# Patient Record
Sex: FEMALE | Race: OTHER | Employment: UNEMPLOYED | URBAN - METROPOLITAN AREA
[De-identification: names, ages, dates, MRNs, and addresses within clinical notes are randomized per-mention and may not be internally consistent; named-entity substitution may affect disease eponyms.]

---

## 2024-01-10 ENCOUNTER — PROCEDURE VISIT (OUTPATIENT)
Dept: OBGYN CLINIC | Age: 29
End: 2024-01-10

## 2024-01-10 VITALS
SYSTOLIC BLOOD PRESSURE: 122 MMHG | HEIGHT: 67 IN | WEIGHT: 175 LBS | DIASTOLIC BLOOD PRESSURE: 82 MMHG | BODY MASS INDEX: 27.47 KG/M2

## 2024-01-10 DIAGNOSIS — Z97.5 IUD (INTRAUTERINE DEVICE) IN PLACE: ICD-10-CM

## 2024-01-10 DIAGNOSIS — Z30.433 ENCOUNTER FOR REMOVAL AND REINSERTION OF INTRAUTERINE CONTRACEPTIVE DEVICE (IUD): Primary | ICD-10-CM

## 2024-01-10 NOTE — PROGRESS NOTES
[facility] PHYSICIANS [facility] OBSTETRICS AND GYNECOLOGY  [id number]   [id number]  [facility] [address]  Dept: [phone]    Patient Name: Glenny Moreno  Patient Age: 28 y.o.  Date of Visit: 1/10/2024    SUBJECTIVE:    CC:    Chief Complaint   Patient presents with    Procedure         Glenny presents today for removal and re-insertion of Mirena IUD for her complaint of  menorrhagia.  Glenny understands the risks and benefits of the intrauterine device insertion and desires to proceed with its insertion.    OBJECTIVE:  No LMP recorded. (Menstrual status: IUD).    /82   Ht 1.702 m (5' 7\")   Wt 79.4 kg (175 lb)   BMI 27.41 kg/m²     GYN HISTORY:    History of STI: no   New Sex Partner (within 3 months): no   Negative Gonorrhea/Chlamydia testing: no, patient declines    Past Medical History:   Diagnosis Date    Anemia affecting pregnancy 2017 Hgb 10.0, iron started    Trauma     elbow and foot as a child          OB History    Para Term  AB Living   3 3 3     3   SAB IAB Ectopic Molar Multiple Live Births           0 3      # Outcome Date GA Lbr Mariusz/2nd Weight Sex Delivery Anes PTL Lv   3 Term 17 39w3d / 00:04 4.125 kg (9 lb 1.5 oz) F Vag-Spont None N CHARISSE   2 Term    3 kg (6 lb 9.8 oz) M Vag-Spont      1 Term    3 kg (6 lb 9.8 oz) F Vag-Spont          Urine pregnancy test indicated: no  Urine pregnancy test: not indicated     Glenny is reasonably certain she is not pregnant:    Glenny has been correctly and consistently using a reliable method of contraception.    Procedure:    The patient was positioned comfortably on the exam table. After a bi-manual exam; the uterus was found to be  midposition. There was no cervical motion tenderness. A sterile speculum was inserted.  The cervix was visualized and Mirena IUD strings with appropriate length at cervical os. They were grasped with ring forceps and entire device was

## 2024-07-19 NOTE — PROGRESS NOTES
Pt states IUD causing pain and cramping on and off and about 2 moths ago pain increased significantly   Per pt she states she thought the pain was from working out.

## 2024-07-24 ENCOUNTER — PROCEDURE VISIT (OUTPATIENT)
Dept: OBGYN CLINIC | Age: 29
End: 2024-07-24
Payer: MEDICAID

## 2024-07-24 ENCOUNTER — HOSPITAL ENCOUNTER (OUTPATIENT)
Age: 29
Setting detail: SPECIMEN
Discharge: HOME | End: 2024-07-24

## 2024-07-24 ENCOUNTER — OFFICE VISIT (OUTPATIENT)
Dept: OBGYN CLINIC | Age: 29
End: 2024-07-24

## 2024-07-24 VITALS
HEIGHT: 65 IN | BODY MASS INDEX: 34.12 KG/M2 | SYSTOLIC BLOOD PRESSURE: 114 MMHG | WEIGHT: 204.8 LBS | DIASTOLIC BLOOD PRESSURE: 70 MMHG

## 2024-07-24 DIAGNOSIS — R10.2 PELVIC PAIN: ICD-10-CM

## 2024-07-24 DIAGNOSIS — Z30.431 IUD CHECK UP: Primary | ICD-10-CM

## 2024-07-24 DIAGNOSIS — Z30.431 IUD CHECK UP: ICD-10-CM

## 2024-07-24 DIAGNOSIS — N89.8 VAGINAL ODOR: ICD-10-CM

## 2024-07-24 DIAGNOSIS — R10.2 PELVIC PRESSURE IN FEMALE: ICD-10-CM

## 2024-07-24 LAB
BILIRUBIN, POC: NEGATIVE
BLOOD URINE, POC: NEGATIVE
CLARITY, POC: NORMAL
COLOR, POC: YELLOW
GLUCOSE URINE, POC: NEGATIVE
KETONES, POC: NORMAL
LEUKOCYTE EST, POC: NORMAL
NITRITE, POC: NEGATIVE
PH, POC: 7
PROTEIN, POC: NEGATIVE
SPECIFIC GRAVITY, POC: 1.02
UROBILINOGEN, POC: 0.2

## 2024-07-24 PROCEDURE — 76830 TRANSVAGINAL US NON-OB: CPT | Performed by: OBSTETRICS & GYNECOLOGY

## 2024-07-24 RX ORDER — NAPROXEN 500 MG/1
TABLET ORAL
COMMUNITY
Start: 2024-07-15

## 2024-07-24 RX ORDER — LORATADINE 10 MG/1
TABLET ORAL
COMMUNITY

## 2024-07-24 RX ORDER — FLUTICASONE PROPIONATE 50 MCG
SPRAY, SUSPENSION (ML) NASAL
COMMUNITY

## 2024-07-24 RX ORDER — CYCLOBENZAPRINE HCL 5 MG
TABLET ORAL
COMMUNITY
Start: 2024-07-15

## 2024-07-24 ASSESSMENT — ENCOUNTER SYMPTOMS
GASTROINTESTINAL NEGATIVE: 1
ALLERGIC/IMMUNOLOGIC NEGATIVE: 1
RESPIRATORY NEGATIVE: 1
EYES NEGATIVE: 1

## 2024-07-24 NOTE — PROGRESS NOTES
Subjective:  Chief Complaint   Patient presents with    Follow-up     HPI:  Glenny Moreno is a 28 y.o. female who arrives to office as an established patient for IUD concern.     Glenny reports starting about 1 month ago she started having low pelvic pain. She thought it was from working out daily because she also was feeling it in her back, htough it is mostly low abdomen. She saw her PCP who noted it is likely her IUD given her associated symptoms of irregular bleeding.   She was having regular cycles prior to IUD, they stopped when she had it placed in January 2024, then starting about 1 month ago she had some brown blood that never \"turned into a period\".   The pain she feels is relieved by OTC meds, she is using them almost daily. Pain has been almost daily this week. Notices pain increase with position changes and turning side to side. No dyspareunia  No vaginal discharge but does report an odor. No urinary or bowel symptoms.     Review of Systems   Constitutional: Negative.  Negative for chills, fatigue, fever and unexpected weight change.   HENT: Negative.     Eyes: Negative.    Respiratory: Negative.     Cardiovascular: Negative.    Gastrointestinal: Negative.    Endocrine: Negative.  Negative for cold intolerance and heat intolerance.   Genitourinary:  Positive for menstrual problem and pelvic pain. Negative for dyspareunia, dysuria, flank pain, frequency, vaginal discharge and vaginal pain.        Vaginal odor   Musculoskeletal: Negative.    Skin: Negative.    Allergic/Immunologic: Negative.    Neurological: Negative.    Hematological: Negative.    Psychiatric/Behavioral: Negative.         Objective:  Vitals:    07/24/24 1146   BP: 114/70   Site: Left Upper Arm   Position: Sitting   Weight: 92.9 kg (204 lb 12.8 oz)   Height: 1.651 m (5' 5\")      Body mass index is 34.08 kg/m².  No LMP recorded (lmp unknown). (Menstrual status: IUD).  Current Outpatient Medications on File Prior to Visit   Medication Sig

## 2024-07-24 NOTE — PROGRESS NOTES
US NON OB TRANSVAGINAL    UTERUS: 9.43 x 5.65 x 4.92 cm  Anteverted, homogeneous    ENDO: 0.56 cm  Trace fluid seen within fundal endo    RT. OVARY: 2.00 x 2.48 x 2.31 cm  Unremarkable    LT. OVARY: 4.22 x 4.35 x 3.54 cm  Complex cyst: 3.24 x 2.60 x 3.09 cm    No pelvic free fluid seen    IUD in proper location    Dilated vessels within left adnexa

## 2024-07-25 LAB
CANDIDA SPECIES: NEGATIVE
ECHO BSA: 2.06 M2
GARDNERELLA VAGINALIS: NEGATIVE
SOURCE: NORMAL
TRICHOMONAS: NEGATIVE

## 2024-07-26 ENCOUNTER — TELEPHONE (OUTPATIENT)
Dept: OBGYN CLINIC | Age: 29
End: 2024-07-26

## 2024-07-26 DIAGNOSIS — N83.202 LEFT OVARIAN CYST: Primary | ICD-10-CM

## 2024-07-26 DIAGNOSIS — Z97.5 IUD (INTRAUTERINE DEVICE) IN PLACE: ICD-10-CM

## 2024-07-26 NOTE — TELEPHONE ENCOUNTER
Reviewed pelvic ultrasound with lGenny including 3 cm ovarian cyst (left). Reviewed this may be causing her pain, but if pain increases then she should notify office. Reviewed cyst will likely spontaneously resolve and the physician recommends repeat in 8-12 weeks. She is agreeable and orders are placed.   Reviewed negative Affirm results as well.

## 2024-10-30 ENCOUNTER — OFFICE VISIT (OUTPATIENT)
Dept: OBGYN CLINIC | Age: 29
End: 2024-10-30
Payer: MEDICAID

## 2024-10-30 ENCOUNTER — PROCEDURE VISIT (OUTPATIENT)
Dept: OBGYN CLINIC | Age: 29
End: 2024-10-30

## 2024-10-30 VITALS
DIASTOLIC BLOOD PRESSURE: 66 MMHG | WEIGHT: 205.4 LBS | SYSTOLIC BLOOD PRESSURE: 100 MMHG | HEIGHT: 67 IN | BODY MASS INDEX: 32.24 KG/M2

## 2024-10-30 DIAGNOSIS — N83.202 LEFT OVARIAN CYST: ICD-10-CM

## 2024-10-30 DIAGNOSIS — N83.202 LEFT OVARIAN CYST: Primary | ICD-10-CM

## 2024-10-30 PROCEDURE — 99212 OFFICE O/P EST SF 10 MIN: CPT | Performed by: ADVANCED PRACTICE MIDWIFE

## 2024-10-30 NOTE — PROGRESS NOTES
US PELVIS COMPLETE NON OB TRANSABDOMINAL AND TRANSVAGINAL    UTERUS: 9.29 x 6.52 x 3.93 cm  Anteverted, homogeneous    ENDO: 0.40 cm  Homogeneous    RT. OVARY: 2.81 x 1.80 x 2.19 cm  Unremarkable    LT. OVARY: 3.14 x 3.21 x 1.94 cm  Unremarkable    No pelvic free fluid seen    Pelvic vessels dilated within left adnexa

## 2024-10-30 NOTE — PROGRESS NOTES
Subjective:  Chief Complaint   Patient presents with    Abdominal Pain     HPI:  Glenny Moreno is a 29 y.o. female who arrives to office as an established patient.    Glenny is here for follow-up on a previous US that showed a 3.24 x 2.60 x 3.09 cm complex cyst on her left ovary.     Review of Systems   Genitourinary:  Negative for pelvic pain.   All other systems reviewed and are negative.      Objective:  Vitals:    10/30/24 1341   BP: 100/66   Weight: 93.2 kg (205 lb 6.4 oz)   Height: 1.702 m (5' 7\")      Body mass index is 32.17 kg/m².  No LMP recorded. (Menstrual status: IUD).  No current outpatient medications on file prior to visit.     No current facility-administered medications on file prior to visit.      Past Medical History:   Diagnosis Date    Anemia affecting pregnancy 7/11/2017 7/11/2017 Hgb 10.0, iron started    Trauma     elbow and foot as a child     Physical Exam  Constitutional:       Appearance: Normal appearance. She is normal weight.   Cardiovascular:      Rate and Rhythm: Normal rate and regular rhythm.      Heart sounds: Normal heart sounds.   Pulmonary:      Effort: Pulmonary effort is normal.   Abdominal:      General: Abdomen is flat.      Tenderness: There is no abdominal tenderness.   Neurological:      Mental Status: She is alert.     Assessment/Plan:    Return if symptoms worsen or fail to improve.    Problem list reviewed and updated as indicated.   Upon completion of the visit all questions were answered.  History was reviewed as documented on Epic Navigator.    The patient, Glenny Moreno,  was seen with a total time spent of 15 minutes for the visit on [facility] date [facility]  The time component involved both face-to-face (counseling and education) and non face-to-face time (care coordination), spent in determining the total time component.

## 2024-10-30 NOTE — PROGRESS NOTES
Per pt she states last time she was here she had a cyst and would isaac it checked again to see if size has increased or decreased     Last US don 7/24/24    Pt also would like to discuss IUD removal

## 2024-10-31 ENCOUNTER — PROCEDURE VISIT (OUTPATIENT)
Dept: OBGYN CLINIC | Age: 29
End: 2024-10-31
Payer: MEDICAID

## 2024-10-31 VITALS
BODY MASS INDEX: 32.18 KG/M2 | SYSTOLIC BLOOD PRESSURE: 100 MMHG | HEIGHT: 67 IN | DIASTOLIC BLOOD PRESSURE: 70 MMHG | WEIGHT: 205 LBS

## 2024-10-31 DIAGNOSIS — Z30.432 ENCOUNTER FOR IUD REMOVAL: Primary | ICD-10-CM

## 2024-10-31 DIAGNOSIS — N83.202 LEFT OVARIAN CYST: ICD-10-CM

## 2024-10-31 PROBLEM — Z97.5 IUD (INTRAUTERINE DEVICE) IN PLACE: Status: RESOLVED | Noted: 2018-04-17 | Resolved: 2024-10-31

## 2024-10-31 PROCEDURE — 58301 REMOVE INTRAUTERINE DEVICE: CPT | Performed by: ADVANCED PRACTICE MIDWIFE

## 2024-10-31 NOTE — PROGRESS NOTES
SUBJECTIVE:       CC:   Chief Complaint   Patient presents with    IUD Removal       HPI:  The patient is requesting that her IUD be removed.   The patient was counseled on the procedure. Risks, benefits and alternatives were reviewed.  Consent for removal was obtained.  She has no other chief complaint today.     Objective:  Blood pressure 100/70, height 1.702 m (5' 7\"), weight 93 kg (205 lb), not currently breastfeeding.  No LMP recorded (lmp unknown). (Menstrual status: IUD).    The patient was counseled on the procedure.  Risks, benefits and alternatives were reviewed. Consent for removal was obtained.  The patient was positioned comfortably on the exam table. A sterile speculum was placed without incident and the string was identified at the cervical portio.The strings were grasped with a ring forcep and the IUD was removed without incident.    Assessment/Plan:   Glenny was seen today for iud removal.    Diagnoses and all orders for this visit:    Encounter for IUD removal       Post procedure restrictions were reviewed and given to the patient.    She is to have 2-3 normal menstrual cycles before attempting conception. She was instructed to use barrier protection   She will return for annual as needed      The patient, Glenny Moreno,  was seen with a total time spent of 15 minutes for the visit [facility] date [facility]  The time component involved both face-to-face (counseling and education) and non face-to-face time (care coordination), spent in determining the total time component.

## 2024-11-01 LAB
ECHO BSA: 2.1 M2
ECHO BSA: 2.1 M2